# Patient Record
Sex: FEMALE | Race: WHITE | NOT HISPANIC OR LATINO | Employment: STUDENT | URBAN - METROPOLITAN AREA
[De-identification: names, ages, dates, MRNs, and addresses within clinical notes are randomized per-mention and may not be internally consistent; named-entity substitution may affect disease eponyms.]

---

## 2021-10-14 ENCOUNTER — OFFICE VISIT (OUTPATIENT)
Dept: URGENT CARE | Facility: URGENT CARE | Age: 21
End: 2021-10-14
Payer: COMMERCIAL

## 2021-10-14 ENCOUNTER — ANCILLARY PROCEDURE (OUTPATIENT)
Dept: GENERAL RADIOLOGY | Facility: CLINIC | Age: 21
End: 2021-10-14
Attending: PHYSICIAN ASSISTANT
Payer: COMMERCIAL

## 2021-10-14 VITALS
DIASTOLIC BLOOD PRESSURE: 72 MMHG | HEART RATE: 102 BPM | WEIGHT: 144 LBS | TEMPERATURE: 98.1 F | SYSTOLIC BLOOD PRESSURE: 107 MMHG | OXYGEN SATURATION: 97 %

## 2021-10-14 DIAGNOSIS — R05.9 COUGH: Primary | ICD-10-CM

## 2021-10-14 DIAGNOSIS — J01.90 ACUTE SINUSITIS WITH SYMPTOMS > 10 DAYS: ICD-10-CM

## 2021-10-14 DIAGNOSIS — J30.2 SEASONAL ALLERGIC RHINITIS, UNSPECIFIED TRIGGER: ICD-10-CM

## 2021-10-14 PROCEDURE — 71046 X-RAY EXAM CHEST 2 VIEWS: CPT | Performed by: RADIOLOGY

## 2021-10-14 PROCEDURE — 99204 OFFICE O/P NEW MOD 45 MIN: CPT | Performed by: PHYSICIAN ASSISTANT

## 2021-10-14 RX ORDER — ALBUTEROL SULFATE 90 UG/1
2 AEROSOL, METERED RESPIRATORY (INHALATION) EVERY 6 HOURS
Qty: 18 G | Refills: 3 | Status: SHIPPED | OUTPATIENT
Start: 2021-10-14

## 2021-10-14 RX ORDER — AZITHROMYCIN 250 MG/1
TABLET, FILM COATED ORAL
Qty: 6 TABLET | Refills: 0 | Status: SHIPPED | OUTPATIENT
Start: 2021-10-14 | End: 2021-10-19

## 2021-10-14 RX ORDER — FLUTICASONE PROPIONATE 50 MCG
1 SPRAY, SUSPENSION (ML) NASAL DAILY
Qty: 18.2 ML | Refills: 1 | Status: SHIPPED | OUTPATIENT
Start: 2021-10-14

## 2021-10-14 RX ORDER — DESOGESTREL AND ETHINYL ESTRADIOL 0.15-0.03
1 KIT ORAL DAILY
COMMUNITY

## 2021-10-14 RX ORDER — CETIRIZINE HYDROCHLORIDE 10 MG/1
10 TABLET ORAL DAILY
Qty: 30 TABLET | Refills: 11 | Status: SHIPPED | OUTPATIENT
Start: 2021-10-14

## 2021-10-14 NOTE — PROGRESS NOTES
Patient presents with:  Urgent Care  Cough: c/o cough for 1 month      (R05.9) Cough  (primary encounter diagnosis)  Comment:   Plan: XR Chest 2 Views, albuterol (PROAIR         HFA/PROVENTIL HFA/VENTOLIN HFA) 108 (90 Base)         MCG/ACT inhaler            (J01.90) Acute sinusitis with symptoms > 10 days  Comment:   Plan: azithromycin (ZITHROMAX) 250 MG tablet            (J30.2) Seasonal allergic rhinitis, unspecified trigger  Comment:   Plan: cetirizine (ZYRTEC) 10 MG tablet, fluticasone         (FLONASE) 50 MCG/ACT nasal spray        Take allergy medication daily (bedtime)  Flonase for 2 weeks at time      Covid-19  Pt was evaluated during a global COVID-19 pandemic, which necessitated consideration that the patient might be at risk for infection with the SARS-CoV-2 virus that causes COVID-19.   Applicable protocols for evaluation were followed during the patient's care.   COVID-19 was considered as part of the patient's evaluation. The plan for testing is:  a test was ordered during this visit.     No severe headache, chest pain, shortness of breath  No additional infectious symptoms  Rest, isolate for 10 days, hydrate, test, follow up if worsening or new symptoms  HH members to isolate until test results, if positive isolate for 2 weeks and follow up for testing if symptoms occur  Red flags and emergent follow up discussed, and understood by patient  Follow up with PCP if symptoms worsen or fail to improve     Surgical mask, gown, shield, hairnet, gloves worn by provider        Patient Instructions   Follow up immediately with severe headache, chest pain, or shortness of breath     Rest, isolate for 10 days, hydrate, follow up if worsening or new symptoms  Household members to isolate until test results, if positive isolate for 2 weeks and follow up for testing if symptoms occur         SUBJECTIVE:   Violette Plascencia is a 21 year old female who presents today with a mostly dry cough for the past month.  Does  have allergies to mold (had allergy testing) but only takes her allergy medications intermittently.     No fevers.     No shortness of breath    No past medical history on file.      Current Outpatient Medications   Medication Sig Dispense Refill     Multiple Vitamins-Iron (DAILY-ART/IRON/BETA-CAROTENE) TABS TAKE 1 TABLET BY MOUTH DAILY. (Patient not taking: Reported on 10/19/2020) 30 tablet 7     Social History     Tobacco Use     Smoking status: Never Smoker     Smokeless tobacco: Never Used   Substance Use Topics     Alcohol use: Not on file     Family History   Problem Relation Age of Onset     Diabetes Mother      Diabetes Father          ROS:    10 point ROS of systems including Constitutional, Eyes, Respiratory, Cardiovascular, Gastroenterology, Genitourinary, Integumentary, Muscularskeletal, Psychiatric ,neurological were all negative except for pertinent positives noted in my HPI       OBJECTIVE:  /72   Pulse 102   Temp 98.1  F (36.7  C) (Tympanic)   Wt 65.3 kg (144 lb)   LMP 09/30/2021   SpO2 97%   Physical Exam:  GENERAL APPEARANCE: healthy, alert and no distress  EYES: EOMI,  PERRL, conjunctiva clear  HENT: ear canals and TM's normal.  Nose and mouth without ulcers, erythema or lesions  HENT: nasal turbinates boggy with bluish hue and rhinorrhea yellow  NECK: supple, nontender, no lymphadenopathy  RESP: lungs clear to auscultation - no rales, rhonchi or wheezes  CV: regular rates and rhythm, normal S1 S2, no murmur noted  ABDOMEN:  soft, nontender, no HSM or masses and bowel sounds normal  NEURO: Normal strength and tone, sensory exam grossly normal,  normal speech and mentation  SKIN: no suspicious lesions or rashes    X-Ray was done, my findings are: normal

## 2021-10-15 NOTE — PATIENT INSTRUCTIONS
(R05.9) Cough  (primary encounter diagnosis)  Comment:   Plan: XR Chest 2 Views, albuterol (PROAIR         HFA/PROVENTIL HFA/VENTOLIN HFA) 108 (90 Base)         MCG/ACT inhaler            (J01.90) Acute sinusitis with symptoms > 10 days  Comment:   Plan: azithromycin (ZITHROMAX) 250 MG tablet            (J30.2) Seasonal allergic rhinitis, unspecified trigger  Comment:   Plan: cetirizine (ZYRTEC) 10 MG tablet, fluticasone         (FLONASE) 50 MCG/ACT nasal spray        Take allergy medication daily (bedtime)  Flonase for 2 weeks at time

## 2021-10-15 NOTE — PROGRESS NOTES
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

## 2022-02-06 ENCOUNTER — HEALTH MAINTENANCE LETTER (OUTPATIENT)
Age: 22
End: 2022-02-06

## 2022-10-03 ENCOUNTER — HEALTH MAINTENANCE LETTER (OUTPATIENT)
Age: 22
End: 2022-10-03

## 2023-02-11 ENCOUNTER — HEALTH MAINTENANCE LETTER (OUTPATIENT)
Age: 23
End: 2023-02-11

## 2024-03-09 ENCOUNTER — HEALTH MAINTENANCE LETTER (OUTPATIENT)
Age: 24
End: 2024-03-09